# Patient Record
(demographics unavailable — no encounter records)

---

## 2025-02-28 NOTE — DISCUSSION/SUMMARY
[de-identified] : I had a long discussion with the patient about the nature of their condition and all available treatment options. We discussed operative and non-operative interventions. After a long discussion, they would like to proceed with structured physical therapy. We also discussed use of anti-inflammatory medications as well as their risks and benefits. I will see them back in approximately 6 weeks time. If they are still having pain, I would recommend an MRI. All of their questions were answered, they agree with the treatment plan we could also consider an injection before any type of MRI.  His blood pressure was very high today and I stressed to him he should go to the emergency room to have this checked out.  He states he has an appointment in about 3 weeks with a primary care doctor and he will wait till that time.  I explained him this is not advisable and if he certainly has any other symptoms including headaches or blurred vision that he must go to the emergency department immediately.  He understands this but did not want to go today..  I did explain to him this could lead to death

## 2025-02-28 NOTE — PHYSICAL EXAM
[de-identified] : Well appearing person in no acute distress. Appears their stated age. Alert and oriented x3. Normal mood and affect. There is no significant swelling present on exam today. They have full range of motion of their knee. No evidence of any erythema or bruising. They have a stable knee to ligamentous exam. They have tenderness over their medial joint line with no significant lateral joint line tenderness. Positive Dustin test. They have no significant tenderness on the undersurface of their patella No pain with range of motion of their hip or ankle. Neurovascularly intact in their lower extremity.  This is the same exam for both the right and left knee  [de-identified] : Multiple weightbearing views right knee ordered and reviewed.  These demonstrate no acute osseous abnormalities.  Joint spaces well-maintained.  Multiple weightbearing views left knee ordered and reviewed.  These demonstrate no acute osseous abnormalities.  Joint spaces well-maintained

## 2025-02-28 NOTE — HISTORY OF PRESENT ILLNESS
[de-identified] : Very pleasant 45-year-old gentleman comes in with bilateral knee pain right worse than left.  This has been going on for some time.  Pain is mostly medial.  No recent trauma.  No recent treatment.  He has had an issue in the past that he saw Dr. Blancas and was treated conservatively.

## 2025-02-28 NOTE — HISTORY OF PRESENT ILLNESS
[de-identified] : Very pleasant 45-year-old gentleman comes in with bilateral knee pain right worse than left.  This has been going on for some time.  Pain is mostly medial.  No recent trauma.  No recent treatment.  He has had an issue in the past that he saw Dr. Blancas and was treated conservatively.

## 2025-02-28 NOTE — DISCUSSION/SUMMARY
[de-identified] : I had a long discussion with the patient about the nature of their condition and all available treatment options. We discussed operative and non-operative interventions. After a long discussion, they would like to proceed with structured physical therapy. We also discussed use of anti-inflammatory medications as well as their risks and benefits. I will see them back in approximately 6 weeks time. If they are still having pain, I would recommend an MRI. All of their questions were answered, they agree with the treatment plan we could also consider an injection before any type of MRI.  His blood pressure was very high today and I stressed to him he should go to the emergency room to have this checked out.  He states he has an appointment in about 3 weeks with a primary care doctor and he will wait till that time.  I explained him this is not advisable and if he certainly has any other symptoms including headaches or blurred vision that he must go to the emergency department immediately.  He understands this but did not want to go today..  I did explain to him this could lead to death

## 2025-02-28 NOTE — PHYSICAL EXAM
[de-identified] : Well appearing person in no acute distress. Appears their stated age. Alert and oriented x3. Normal mood and affect. There is no significant swelling present on exam today. They have full range of motion of their knee. No evidence of any erythema or bruising. They have a stable knee to ligamentous exam. They have tenderness over their medial joint line with no significant lateral joint line tenderness. Positive Dustin test. They have no significant tenderness on the undersurface of their patella No pain with range of motion of their hip or ankle. Neurovascularly intact in their lower extremity.  This is the same exam for both the right and left knee  [de-identified] : Multiple weightbearing views right knee ordered and reviewed.  These demonstrate no acute osseous abnormalities.  Joint spaces well-maintained.  Multiple weightbearing views left knee ordered and reviewed.  These demonstrate no acute osseous abnormalities.  Joint spaces well-maintained